# Patient Record
(demographics unavailable — no encounter records)

---

## 2025-01-14 NOTE — HISTORY OF PRESENT ILLNESS
[de-identified] : prior evaluation of thyroid nodule. cytologically benign. denies dysphagia, hoarseness or new lesions. no changes medically since last visit.  recent sonogram stable. I have reviewed all old and new data and available images.  recent TSH and calcium  normal

## 2025-01-14 NOTE — PHYSICAL EXAM
[de-identified] : 1.8 cm left thyroid nodule, well circumscribed and mobile [Laryngoscopy Performed] : laryngoscopy was performed, see procedure section for findings [Midline] : located in midline position [Normal] : orientation to person, place, and time: normal

## 2025-01-14 NOTE — ASSESSMENT
[FreeTextEntry1] : will observe. no indication for any biopsies at this time.  for repeat sonogram next visit. to return earlier if any change.    patient has been given the opportunity to ask questions, and all of the patient's questions have been answered to their satisfaction.  bloods drawn. to call next week for results.